# Patient Record
Sex: MALE | NOT HISPANIC OR LATINO | URBAN - METROPOLITAN AREA
[De-identification: names, ages, dates, MRNs, and addresses within clinical notes are randomized per-mention and may not be internally consistent; named-entity substitution may affect disease eponyms.]

---

## 2021-12-14 ENCOUNTER — TELEPHONE ENCOUNTER (OUTPATIENT)
Dept: URBAN - METROPOLITAN AREA CLINIC 92 | Facility: CLINIC | Age: 66
End: 2021-12-14

## 2021-12-14 RX ORDER — FAMOTIDINE 40 MG/1
1 TABLET AT BEDTIME TABLET, FILM COATED ORAL ONCE A DAY
Qty: 90 TABLET | Refills: 1 | OUTPATIENT
Start: 2021-12-15

## 2021-12-20 ENCOUNTER — LAB OUTSIDE AN ENCOUNTER (OUTPATIENT)
Dept: URBAN - METROPOLITAN AREA CLINIC 82 | Facility: CLINIC | Age: 66
End: 2021-12-20

## 2021-12-20 ENCOUNTER — OFFICE VISIT (OUTPATIENT)
Dept: URBAN - METROPOLITAN AREA CLINIC 82 | Facility: CLINIC | Age: 66
End: 2021-12-20
Payer: MEDICARE

## 2021-12-20 VITALS
WEIGHT: 226 LBS | BODY MASS INDEX: 30.61 KG/M2 | TEMPERATURE: 97.2 F | HEART RATE: 106 BPM | HEIGHT: 72 IN | DIASTOLIC BLOOD PRESSURE: 86 MMHG | SYSTOLIC BLOOD PRESSURE: 136 MMHG

## 2021-12-20 DIAGNOSIS — R10.12 LUQ ABDOMINAL PAIN: ICD-10-CM

## 2021-12-20 DIAGNOSIS — K21.9 GASTROESOPHAGEAL REFLUX DISEASE, UNSPECIFIED WHETHER ESOPHAGITIS PRESENT: ICD-10-CM

## 2021-12-20 DIAGNOSIS — Z12.11 COLON CANCER SCREENING: ICD-10-CM

## 2021-12-20 PROBLEM — 235595009: Status: ACTIVE | Noted: 2021-12-20

## 2021-12-20 PROCEDURE — 99204 OFFICE O/P NEW MOD 45 MIN: CPT | Performed by: INTERNAL MEDICINE

## 2021-12-20 RX ORDER — GLIMEPIRIDE 2 MG/1
1 TABLET WITH BREAKFAST OR THE FIRST MAIN MEAL OF THE DAY TABLET ORAL ONCE A DAY
Status: ACTIVE | COMMUNITY

## 2021-12-20 RX ORDER — POLYETHYLENE GLYCOL 3350 17 G/17G
AS DIRECTED PRIOR TO COLONOSCOPY POWDER, FOR SOLUTION ORAL ONCE
Qty: 238 GRAM | Refills: 0 | OUTPATIENT
Start: 2021-12-21 | End: 2021-12-22

## 2021-12-20 RX ORDER — SUCRALFATE 1 G
1 TABLET ON AN EMPTY STOMACH TABLET ORAL TWICE A DAY
Qty: 60 TABLET | Refills: 0 | OUTPATIENT
Start: 2021-12-21 | End: 2022-01-20

## 2021-12-20 RX ORDER — LISINOPRIL 40 MG/1
1 TABLET TABLET ORAL ONCE A DAY
Status: ACTIVE | COMMUNITY

## 2021-12-20 RX ORDER — AMLODIPINE BESYLATE 10 MG/1
1 TABLET TABLET ORAL ONCE A DAY
Status: ACTIVE | COMMUNITY

## 2021-12-20 RX ORDER — OMEPRAZOLE 40 MG/1
1 CAPSULE 30 MINUTES BEFORE MORNING MEAL CAPSULE, DELAYED RELEASE ORAL ONCE A DAY
Qty: 30 CAPSULE | Refills: 1 | OUTPATIENT
Start: 2021-12-20

## 2021-12-20 RX ORDER — ALLOPURINOL 300 MG/1
1 TABLET TABLET ORAL ONCE A DAY
Status: ACTIVE | COMMUNITY

## 2021-12-20 RX ORDER — LORAZEPAM 0.5 MG/1
1 TABLET AT BEDTIME AS NEEDED TABLET ORAL ONCE A DAY
Status: ACTIVE | COMMUNITY

## 2021-12-20 RX ORDER — HYDROCHLOROTHIAZIDE 12.5 MG/1
1 CAPSULE IN THE MORNING CAPSULE ORAL ONCE A DAY
Status: ACTIVE | COMMUNITY

## 2021-12-20 RX ORDER — FAMOTIDINE 40 MG/1
1 TABLET AT BEDTIME TABLET, FILM COATED ORAL ONCE A DAY
Qty: 90 TABLET | Refills: 1 | Status: ACTIVE | COMMUNITY
Start: 2021-12-15

## 2021-12-20 RX ORDER — ATORVASTATIN CALCIUM 20 MG/1
1 TABLET TABLET, FILM COATED ORAL ONCE A DAY
Status: ACTIVE | COMMUNITY

## 2021-12-20 NOTE — HPI-TODAY'S VISIT:
Earle Dalal is a 66-year-old male came in for screening colonoscopy evaluation and also complains of having left upper quadrant abdominal pain as well as discomfort.  He reports having the symptoms ongoing off and on for the past month symptoms getting worse epigastric abdominal pain after eating food.  Denies any surgical history or family history.  Patient denies any unintentional weight loss.  He has been taking PPI for 2 weeks with some relief.  He also complains of having blood in his stools mostly when he wipes.  Denies any blood mixed with the stool or her melena.  Admits to using NSAIDs occasionally.  His last colonoscopy was about 10 years ago which was unremarkable.

## 2021-12-20 NOTE — PHYSICAL EXAM GASTROINTESTINAL
Abdomen , soft, nontender, nondistended , no guarding or rigidity , no masses palpable , normal bowel sounds , Liver and Spleen , no hepatomegaly present , no hepatosplenomegaly , liver nontender , spleen not palpable ventral hernia

## 2021-12-21 ENCOUNTER — OFFICE VISIT (OUTPATIENT)
Dept: URBAN - METROPOLITAN AREA SURGERY CENTER 13 | Facility: SURGERY CENTER | Age: 66
End: 2021-12-21
Payer: MEDICARE

## 2021-12-21 ENCOUNTER — CLAIMS CREATED FROM THE CLAIM WINDOW (OUTPATIENT)
Dept: URBAN - METROPOLITAN AREA CLINIC 4 | Facility: CLINIC | Age: 66
End: 2021-12-21
Payer: MEDICARE

## 2021-12-21 DIAGNOSIS — K31.89 ACQUIRED DEFORMITY OF DUODENUM: ICD-10-CM

## 2021-12-21 DIAGNOSIS — K22.2 ACQUIRED ESOPHAGEAL RING: ICD-10-CM

## 2021-12-21 DIAGNOSIS — R10.13 ABDOMINAL DISCOMFORT, EPIGASTRIC: ICD-10-CM

## 2021-12-21 DIAGNOSIS — K29.70 GASTRITIS, UNSPECIFIED, WITHOUT BLEEDING: ICD-10-CM

## 2021-12-21 DIAGNOSIS — K22.89 ESOPHAGEAL BLEEDING: ICD-10-CM

## 2021-12-21 PROCEDURE — 88312 SPECIAL STAINS GROUP 1: CPT | Performed by: PATHOLOGY

## 2021-12-21 PROCEDURE — G8907 PT DOC NO EVENTS ON DISCHARG: HCPCS | Performed by: INTERNAL MEDICINE

## 2021-12-21 PROCEDURE — 88305 TISSUE EXAM BY PATHOLOGIST: CPT | Performed by: PATHOLOGY

## 2021-12-21 PROCEDURE — 43239 EGD BIOPSY SINGLE/MULTIPLE: CPT | Performed by: INTERNAL MEDICINE

## 2021-12-21 RX ORDER — ATORVASTATIN CALCIUM 20 MG/1
1 TABLET TABLET, FILM COATED ORAL ONCE A DAY
Status: ACTIVE | COMMUNITY

## 2021-12-21 RX ORDER — LORAZEPAM 0.5 MG/1
1 TABLET AT BEDTIME AS NEEDED TABLET ORAL ONCE A DAY
Status: ACTIVE | COMMUNITY

## 2021-12-21 RX ORDER — LISINOPRIL 40 MG/1
1 TABLET TABLET ORAL ONCE A DAY
Status: ACTIVE | COMMUNITY

## 2021-12-21 RX ORDER — ALLOPURINOL 300 MG/1
1 TABLET TABLET ORAL ONCE A DAY
Status: ACTIVE | COMMUNITY

## 2021-12-21 RX ORDER — GLIMEPIRIDE 2 MG/1
1 TABLET WITH BREAKFAST OR THE FIRST MAIN MEAL OF THE DAY TABLET ORAL ONCE A DAY
Status: ACTIVE | COMMUNITY

## 2021-12-21 RX ORDER — HYDROCHLOROTHIAZIDE 12.5 MG/1
1 CAPSULE IN THE MORNING CAPSULE ORAL ONCE A DAY
Status: ACTIVE | COMMUNITY

## 2021-12-21 RX ORDER — AMLODIPINE BESYLATE 10 MG/1
1 TABLET TABLET ORAL ONCE A DAY
Status: ACTIVE | COMMUNITY

## 2021-12-21 RX ORDER — FAMOTIDINE 40 MG/1
1 TABLET AT BEDTIME TABLET, FILM COATED ORAL ONCE A DAY
Qty: 90 TABLET | Refills: 1 | Status: ACTIVE | COMMUNITY
Start: 2021-12-15

## 2021-12-21 RX ORDER — OMEPRAZOLE 40 MG/1
1 CAPSULE 30 MINUTES BEFORE MORNING MEAL CAPSULE, DELAYED RELEASE ORAL ONCE A DAY
Qty: 30 CAPSULE | Refills: 1 | Status: ACTIVE | COMMUNITY
Start: 2021-12-20

## 2021-12-23 ENCOUNTER — OFFICE VISIT (OUTPATIENT)
Dept: URBAN - METROPOLITAN AREA TELEHEALTH 2 | Facility: TELEHEALTH | Age: 66
End: 2021-12-23

## 2021-12-28 ENCOUNTER — TELEPHONE ENCOUNTER (OUTPATIENT)
Dept: URBAN - METROPOLITAN AREA CLINIC 92 | Facility: CLINIC | Age: 66
End: 2021-12-28

## 2022-01-11 ENCOUNTER — ERX REFILL RESPONSE (OUTPATIENT)
Dept: URBAN - METROPOLITAN AREA CLINIC 82 | Facility: CLINIC | Age: 67
End: 2022-01-11

## 2022-01-11 RX ORDER — SUCRALFATE 1 G/1
TAKE 1 TABLET BY MOUTH TWICE A DAY ON EMPTY STOMACH FOR 30 DAYS TABLET ORAL
Qty: 60 TABLET | Refills: 1 | OUTPATIENT

## 2022-01-11 RX ORDER — SUCRALFATE 1 G
1 TABLET ON AN EMPTY STOMACH TABLET ORAL TWICE A DAY
Qty: 60 TABLET | Refills: 0 | OUTPATIENT

## 2022-01-12 ENCOUNTER — ERX REFILL RESPONSE (OUTPATIENT)
Dept: URBAN - METROPOLITAN AREA CLINIC 82 | Facility: CLINIC | Age: 67
End: 2022-01-12

## 2022-01-12 RX ORDER — OMEPRAZOLE 40 MG/1
TAKE 1 CAPSULE BY MOUTH EVERY DAY 30 MINUTES BEFORE MORNING MEAL FOR 30 DAYS CAPSULE, DELAYED RELEASE ORAL
Qty: 30 CAPSULE | Refills: 2 | OUTPATIENT

## 2022-01-12 RX ORDER — OMEPRAZOLE 40 MG/1
1 CAPSULE 30 MINUTES BEFORE MORNING MEAL CAPSULE, DELAYED RELEASE ORAL ONCE A DAY
Qty: 30 CAPSULE | Refills: 1 | OUTPATIENT

## 2022-02-04 ENCOUNTER — WEB ENCOUNTER (OUTPATIENT)
Dept: URBAN - METROPOLITAN AREA CLINIC 82 | Facility: CLINIC | Age: 67
End: 2022-02-04

## 2022-02-07 ENCOUNTER — ERX REFILL RESPONSE (OUTPATIENT)
Dept: URBAN - METROPOLITAN AREA CLINIC 82 | Facility: CLINIC | Age: 67
End: 2022-02-07

## 2022-02-07 RX ORDER — OMEPRAZOLE 40 MG/1
TAKE 1 CAPSULE BY MOUTH EVERY DAY 30 MINUTES BEFORE MORNING MEAL FOR 30 DAYS CAPSULE, DELAYED RELEASE ORAL
Qty: 30 CAPSULE | Refills: 2 | OUTPATIENT

## 2022-03-06 ENCOUNTER — ERX REFILL RESPONSE (OUTPATIENT)
Dept: URBAN - METROPOLITAN AREA CLINIC 82 | Facility: CLINIC | Age: 67
End: 2022-03-06

## 2022-03-06 RX ORDER — OMEPRAZOLE 40 MG/1
TAKE 1 CAPSULE BY MOUTH EVERY DAY 30 MINUTES BEFORE MORNING MEAL FOR 30 DAYS CAPSULE, DELAYED RELEASE ORAL
Qty: 30 CAPSULE | Refills: 2 | OUTPATIENT

## 2022-03-31 ENCOUNTER — ERX REFILL RESPONSE (OUTPATIENT)
Dept: URBAN - METROPOLITAN AREA CLINIC 82 | Facility: CLINIC | Age: 67
End: 2022-03-31

## 2022-03-31 RX ORDER — OMEPRAZOLE 40 MG/1
TAKE 1 CAPSULE BY MOUTH EVERY DAY 30 MINUTES BEFORE MORNING MEAL FOR 30 DAYS CAPSULE, DELAYED RELEASE ORAL
Qty: 30 CAPSULE | Refills: 2 | OUTPATIENT

## 2022-05-02 ENCOUNTER — ERX REFILL RESPONSE (OUTPATIENT)
Dept: URBAN - METROPOLITAN AREA CLINIC 82 | Facility: CLINIC | Age: 67
End: 2022-05-02

## 2022-05-02 RX ORDER — OMEPRAZOLE 40 MG/1
TAKE 1 CAPSULE BY MOUTH EVERY DAY 30 MINUTES BEFORE MORNING MEAL FOR 30 DAYS CAPSULE, DELAYED RELEASE ORAL
Qty: 30 CAPSULE | Refills: 2 | OUTPATIENT

## 2022-05-06 ENCOUNTER — TELEPHONE ENCOUNTER (OUTPATIENT)
Dept: URBAN - METROPOLITAN AREA CLINIC 115 | Facility: CLINIC | Age: 67
End: 2022-05-06

## 2022-05-06 RX ORDER — OMEPRAZOLE 40 MG/1
TAKE 1 CAPSULE BY MOUTH EVERY DAY 30 MINUTES BEFORE MORNING MEAL FOR 30 DAYS CAPSULE, DELAYED RELEASE ORAL
Qty: 30 CAPSULE | Refills: 2

## 2022-06-02 ENCOUNTER — ERX REFILL RESPONSE (OUTPATIENT)
Dept: URBAN - METROPOLITAN AREA CLINIC 82 | Facility: CLINIC | Age: 67
End: 2022-06-02

## 2022-06-02 RX ORDER — OMEPRAZOLE 40 MG/1
TAKE 1 CAPSULE BY MOUTH EVERY DAY 30 MINUTES BEFORE MORNING MEAL FOR 30 DAYS CAPSULE, DELAYED RELEASE ORAL
Qty: 30 CAPSULE | Refills: 2 | OUTPATIENT

## 2022-08-16 ENCOUNTER — ERX REFILL RESPONSE (OUTPATIENT)
Dept: URBAN - METROPOLITAN AREA CLINIC 82 | Facility: CLINIC | Age: 67
End: 2022-08-16

## 2022-08-16 RX ORDER — OMEPRAZOLE 40 MG/1
TAKE 1 CAPSULE BY MOUTH EVERY DAY 30 MINUTES BEFORE MORNING MEAL FOR 30 DAYS CAPSULE, DELAYED RELEASE ORAL
Qty: 30 CAPSULE | Refills: 1 | OUTPATIENT

## 2022-08-16 RX ORDER — OMEPRAZOLE 40 MG/1
TAKE 1 CAPSULE BY MOUTH EVERY DAY 30 MINUTES BEFORE MORNING MEAL FOR 30 DAYS CAPSULE, DELAYED RELEASE ORAL
Qty: 30 CAPSULE | Refills: 2 | OUTPATIENT

## 2022-09-20 ENCOUNTER — ERX REFILL RESPONSE (OUTPATIENT)
Dept: URBAN - METROPOLITAN AREA CLINIC 82 | Facility: CLINIC | Age: 67
End: 2022-09-20

## 2022-09-20 RX ORDER — OMEPRAZOLE 40 MG/1
TAKE 1 CAPSULE BY MOUTH EVERY DAY 30 MINUTES BEFORE MORNING MEAL FOR 30 DAYS CAPSULE, DELAYED RELEASE ORAL
Qty: 30 CAPSULE | Refills: 1 | OUTPATIENT

## 2022-09-26 ENCOUNTER — ERX REFILL RESPONSE (OUTPATIENT)
Dept: URBAN - METROPOLITAN AREA CLINIC 82 | Facility: CLINIC | Age: 67
End: 2022-09-26

## 2022-09-26 RX ORDER — OMEPRAZOLE 40 MG/1
TAKE 1 CAPSULE BY MOUTH EVERY DAY 30 MINUTES BEFORE MORNING MEAL CAPSULE, DELAYED RELEASE ORAL
Qty: 90 CAPSULE | Refills: 0 | OUTPATIENT

## 2022-09-27 PROBLEM — 305058001: Status: ACTIVE | Noted: 2021-12-20

## 2022-10-07 ENCOUNTER — OFFICE VISIT (OUTPATIENT)
Dept: URBAN - METROPOLITAN AREA SURGERY CENTER 13 | Facility: SURGERY CENTER | Age: 67
End: 2022-10-07
Payer: MEDICARE

## 2022-10-07 ENCOUNTER — TELEPHONE ENCOUNTER (OUTPATIENT)
Dept: URBAN - METROPOLITAN AREA CLINIC 92 | Facility: CLINIC | Age: 67
End: 2022-10-07

## 2022-10-07 DIAGNOSIS — Z12.11 COLON CANCER SCREENING: ICD-10-CM

## 2022-10-07 PROCEDURE — G0121 COLON CA SCRN NOT HI RSK IND: HCPCS | Performed by: INTERNAL MEDICINE

## 2022-10-07 PROCEDURE — G8907 PT DOC NO EVENTS ON DISCHARG: HCPCS | Performed by: INTERNAL MEDICINE

## 2022-10-07 RX ORDER — FAMOTIDINE 40 MG/1
1 TABLET AT BEDTIME TABLET, FILM COATED ORAL ONCE A DAY
Qty: 90 TABLET | Refills: 1 | Status: ACTIVE | COMMUNITY
Start: 2021-12-15

## 2022-10-07 RX ORDER — SUCRALFATE 1 G/1
TAKE 1 TABLET BY MOUTH TWICE A DAY ON EMPTY STOMACH FOR 30 DAYS TABLET ORAL
Qty: 60 TABLET | Refills: 1

## 2022-10-07 RX ORDER — OMEPRAZOLE 40 MG/1
TAKE 1 CAPSULE BY MOUTH EVERY DAY 30 MINUTES BEFORE MORNING MEAL CAPSULE, DELAYED RELEASE ORAL
Qty: 90 CAPSULE | Refills: 0 | Status: ACTIVE | COMMUNITY

## 2022-10-07 RX ORDER — AMLODIPINE BESYLATE 10 MG/1
1 TABLET TABLET ORAL ONCE A DAY
Status: ACTIVE | COMMUNITY

## 2022-10-07 RX ORDER — LORAZEPAM 0.5 MG/1
1 TABLET AT BEDTIME AS NEEDED TABLET ORAL ONCE A DAY
Status: ACTIVE | COMMUNITY

## 2022-10-07 RX ORDER — SUCRALFATE 1 G/1
TAKE 1 TABLET BY MOUTH TWICE A DAY ON EMPTY STOMACH FOR 30 DAYS TABLET ORAL
Qty: 60 TABLET | Refills: 1 | Status: ACTIVE | COMMUNITY

## 2022-10-07 RX ORDER — LISINOPRIL 40 MG/1
1 TABLET TABLET ORAL ONCE A DAY
Status: ACTIVE | COMMUNITY

## 2022-10-07 RX ORDER — FAMOTIDINE 40 MG/1
1 TABLET AT BEDTIME TABLET, FILM COATED ORAL ONCE A DAY
Qty: 90 TABLET | Refills: 1
Start: 2021-12-15

## 2022-10-07 RX ORDER — ATORVASTATIN CALCIUM 20 MG/1
1 TABLET TABLET, FILM COATED ORAL ONCE A DAY
Status: ACTIVE | COMMUNITY

## 2022-10-07 RX ORDER — GLIMEPIRIDE 2 MG/1
1 TABLET WITH BREAKFAST OR THE FIRST MAIN MEAL OF THE DAY TABLET ORAL ONCE A DAY
Status: ACTIVE | COMMUNITY

## 2022-10-07 RX ORDER — ALLOPURINOL 300 MG/1
1 TABLET TABLET ORAL ONCE A DAY
Status: ACTIVE | COMMUNITY

## 2022-10-07 RX ORDER — HYDROCHLOROTHIAZIDE 12.5 MG/1
1 CAPSULE IN THE MORNING CAPSULE ORAL ONCE A DAY
Status: ACTIVE | COMMUNITY

## 2022-10-20 ENCOUNTER — ERX REFILL RESPONSE (OUTPATIENT)
Dept: URBAN - METROPOLITAN AREA CLINIC 82 | Facility: CLINIC | Age: 67
End: 2022-10-20

## 2022-10-20 RX ORDER — OMEPRAZOLE 40 MG/1
TAKE 1 CAPSULE BY MOUTH EVERY DAY 30 MINUTES BEFORE MORNING MEAL FOR 30 DAYS CAPSULE, DELAYED RELEASE ORAL
Qty: 30 CAPSULE | Refills: 1 | OUTPATIENT

## 2022-10-20 RX ORDER — OMEPRAZOLE 40 MG/1
TAKE 1 CAPSULE BY MOUTH EVERY DAY 30 MINUTES BEFORE MORNING MEAL FOR 30 DAYS CAPSULE, DELAYED RELEASE ORAL
Qty: 30 CAPSULE | Refills: 2 | OUTPATIENT

## 2022-10-31 ENCOUNTER — ERX REFILL RESPONSE (OUTPATIENT)
Dept: URBAN - METROPOLITAN AREA CLINIC 82 | Facility: CLINIC | Age: 67
End: 2022-10-31

## 2022-10-31 RX ORDER — SUCRALFATE 1 G/1
TAKE 1 TABLET BY MOUTH TWICE A DAY ON EMPTY STOMACH FOR 30 DAYS 30 DAYS TABLET ORAL
Qty: 60 TABLET | Refills: 1 | OUTPATIENT

## 2022-10-31 RX ORDER — SUCRALFATE 1 G/1
TAKE 1 TABLET BY MOUTH TWICE A DAY ON EMPTY STOMACH FOR 30 DAYS TABLET ORAL
Qty: 60 TABLET | Refills: 1 | OUTPATIENT

## 2022-11-22 ENCOUNTER — ERX REFILL RESPONSE (OUTPATIENT)
Dept: URBAN - METROPOLITAN AREA CLINIC 82 | Facility: CLINIC | Age: 67
End: 2022-11-22

## 2022-11-22 RX ORDER — SUCRALFATE 1 G/1
TAKE 1 TABLET BY MOUTH TWICE A DAY ON EMPTY STOMACH FOR 30 DAYS 30 DAYS TABLET ORAL
Qty: 60 TABLET | Refills: 1 | OUTPATIENT

## 2022-12-18 ENCOUNTER — ERX REFILL RESPONSE (OUTPATIENT)
Dept: URBAN - METROPOLITAN AREA CLINIC 115 | Facility: CLINIC | Age: 67
End: 2022-12-18

## 2022-12-18 RX ORDER — OMEPRAZOLE 40 MG/1
TAKE 1 CAPSULE BY MOUTH EVERY DAY 30 MINUTES BEFORE MORNING MEAL FOR 30 DAYS CAPSULE, DELAYED RELEASE ORAL
Qty: 30 CAPSULE | Refills: 1 | OUTPATIENT

## 2022-12-18 RX ORDER — OMEPRAZOLE 40 MG/1
TAKE 1 CAPSULE BY MOUTH EVERY DAY 30 MINUTES BEFORE MORNING MEAL FOR 30 DAYS CAPSULE, DELAYED RELEASE ORAL
Qty: 90 CAPSULE | Refills: 1 | OUTPATIENT

## 2023-02-19 ENCOUNTER — TELEPHONE ENCOUNTER (OUTPATIENT)
Dept: URBAN - METROPOLITAN AREA CLINIC 82 | Facility: CLINIC | Age: 68
End: 2023-02-19

## 2023-02-19 RX ORDER — FAMOTIDINE 40 MG/1
1 TABLET AT BEDTIME TABLET, FILM COATED ORAL ONCE A DAY
Qty: 90 | Refills: 0
Start: 2021-12-15

## 2023-04-12 ENCOUNTER — WEB ENCOUNTER (OUTPATIENT)
Dept: URBAN - METROPOLITAN AREA CLINIC 115 | Facility: CLINIC | Age: 68
End: 2023-04-12

## 2023-04-12 ENCOUNTER — OFFICE VISIT (OUTPATIENT)
Dept: URBAN - METROPOLITAN AREA CLINIC 115 | Facility: CLINIC | Age: 68
End: 2023-04-12
Payer: MEDICARE

## 2023-04-12 VITALS
TEMPERATURE: 98.1 F | HEIGHT: 72 IN | DIASTOLIC BLOOD PRESSURE: 84 MMHG | BODY MASS INDEX: 30.34 KG/M2 | HEART RATE: 96 BPM | SYSTOLIC BLOOD PRESSURE: 147 MMHG | WEIGHT: 224 LBS

## 2023-04-12 DIAGNOSIS — K21.9 GASTROESOPHAGEAL REFLUX DISEASE, UNSPECIFIED WHETHER ESOPHAGITIS PRESENT: ICD-10-CM

## 2023-04-12 DIAGNOSIS — K29.50 CHRONIC GASTRITIS WITHOUT BLEEDING, UNSPECIFIED GASTRITIS TYPE: ICD-10-CM

## 2023-04-12 PROBLEM — 8493009: Status: ACTIVE | Noted: 2023-04-12

## 2023-04-12 PROCEDURE — 99214 OFFICE O/P EST MOD 30 MIN: CPT | Performed by: INTERNAL MEDICINE

## 2023-04-12 RX ORDER — FAMOTIDINE 40 MG/1
1 TABLET AT BEDTIME TABLET, FILM COATED ORAL ONCE A DAY
Qty: 90 | Refills: 0 | Status: ACTIVE | COMMUNITY
Start: 2021-12-15

## 2023-04-12 RX ORDER — LISINOPRIL 40 MG/1
1 TABLET TABLET ORAL ONCE A DAY
Status: ACTIVE | COMMUNITY

## 2023-04-12 RX ORDER — LORAZEPAM 0.5 MG/1
1 TABLET AT BEDTIME AS NEEDED TABLET ORAL ONCE A DAY
Status: ACTIVE | COMMUNITY

## 2023-04-12 RX ORDER — AMLODIPINE BESYLATE 10 MG/1
1 TABLET TABLET ORAL ONCE A DAY
Status: ACTIVE | COMMUNITY

## 2023-04-12 RX ORDER — SUCRALFATE 1 G/1
TAKE 1 TABLET BY MOUTH TWICE A DAY ON EMPTY STOMACH FOR 30 DAYS 30 DAYS TABLET ORAL
Qty: 60 TABLET | Refills: 1 | Status: ACTIVE | COMMUNITY

## 2023-04-12 RX ORDER — GLIMEPIRIDE 2 MG/1
1 TABLET WITH BREAKFAST OR THE FIRST MAIN MEAL OF THE DAY TABLET ORAL ONCE A DAY
Status: ACTIVE | COMMUNITY

## 2023-04-12 RX ORDER — OMEPRAZOLE 40 MG/1
TAKE 1 CAPSULE BY MOUTH EVERY DAY 30 MINUTES BEFORE MORNING MEAL FOR 30 DAYS CAPSULE, DELAYED RELEASE ORAL
Qty: 90 CAPSULE | Refills: 1 | Status: ACTIVE | COMMUNITY

## 2023-04-12 RX ORDER — ATORVASTATIN CALCIUM 20 MG/1
1 TABLET TABLET, FILM COATED ORAL ONCE A DAY
Status: ACTIVE | COMMUNITY

## 2023-04-12 RX ORDER — ALLOPURINOL 300 MG/1
1 TABLET TABLET ORAL ONCE A DAY
Status: ACTIVE | COMMUNITY

## 2023-04-12 RX ORDER — HYDROCHLOROTHIAZIDE 12.5 MG/1
1 CAPSULE IN THE MORNING CAPSULE ORAL ONCE A DAY
Status: ACTIVE | COMMUNITY

## 2023-04-12 RX ORDER — ESOMEPRAZOLE MAGNESIUM 20 MG/1
1 CAPSULE CAPSULE, DELAYED RELEASE ORAL ONCE A DAY
Qty: 90 CAPSULE | Refills: 5 | OUTPATIENT
Start: 2023-04-12

## 2023-04-12 NOTE — HPI-TODAY'S VISIT:
Patient was seen today for the follow-up denies any abdominal pain nausea vomiting upper GI symptoms has been well controlled.  Patient stated Nexium over-the-counter helps him his reflux symptoms are better than Prilosec.  Patient denies any dysphagia prior upper endoscopy and colonoscopy findings were all reviewed.  Patient denies any dysphagia.

## 2023-05-12 ENCOUNTER — TELEPHONE ENCOUNTER (OUTPATIENT)
Dept: URBAN - METROPOLITAN AREA CLINIC 92 | Facility: CLINIC | Age: 68
End: 2023-05-12

## 2023-05-12 RX ORDER — SUCRALFATE 1 G/1
TAKE 1 TABLET BY MOUTH TWICE A DAY ON EMPTY STOMACH FOR 30 DAYS 30 DAYS TABLET ORAL
Qty: 60 TABLET | Refills: 1

## 2023-06-22 ENCOUNTER — ERX REFILL RESPONSE (OUTPATIENT)
Dept: URBAN - METROPOLITAN AREA CLINIC 82 | Facility: CLINIC | Age: 68
End: 2023-06-22

## 2023-06-22 RX ORDER — FAMOTIDINE 40 MG/1
TAKE 1 TABLET BY MOUTH EVERY DAY AT BEDTIME FOR 90 DAYS TABLET, FILM COATED ORAL
Qty: 90 TABLET | Refills: 0 | OUTPATIENT

## 2023-06-22 RX ORDER — FAMOTIDINE 40 MG/1
1 TABLET AT BEDTIME TABLET, FILM COATED ORAL ONCE A DAY
Qty: 90 | Refills: 0 | OUTPATIENT

## 2023-09-24 ENCOUNTER — ERX REFILL RESPONSE (OUTPATIENT)
Dept: URBAN - METROPOLITAN AREA CLINIC 82 | Facility: CLINIC | Age: 68
End: 2023-09-24

## 2023-09-24 RX ORDER — FAMOTIDINE 40 MG/1
TAKE 1 TABLET BY MOUTH EVERY DAY AT BEDTIME TABLET, FILM COATED ORAL
Qty: 90 TABLET | Refills: 0 | OUTPATIENT

## 2023-11-08 ENCOUNTER — TELEPHONE ENCOUNTER (OUTPATIENT)
Dept: URBAN - METROPOLITAN AREA CLINIC 115 | Facility: CLINIC | Age: 68
End: 2023-11-08

## 2023-11-08 RX ORDER — SUCRALFATE 1 G/1
TAKE 1 TABLET BY MOUTH TWICE A DAY ON EMPTY STOMACH FOR 30 DAYS 30 DAYS TABLET ORAL
Qty: 60 TABLET | Refills: 1

## 2023-12-17 ENCOUNTER — ERX REFILL RESPONSE (OUTPATIENT)
Dept: URBAN - METROPOLITAN AREA CLINIC 82 | Facility: CLINIC | Age: 68
End: 2023-12-17

## 2023-12-17 RX ORDER — FAMOTIDINE 40 MG/1
TAKE 1 TABLET BY MOUTH EVERY DAY AT BEDTIME TABLET, FILM COATED ORAL
Qty: 90 TABLET | Refills: 0 | OUTPATIENT

## 2023-12-29 ENCOUNTER — ERX REFILL RESPONSE (OUTPATIENT)
Dept: URBAN - METROPOLITAN AREA CLINIC 82 | Facility: CLINIC | Age: 68
End: 2023-12-29

## 2023-12-29 RX ORDER — SUCRALFATE 1 G/1
TAKE 1 TABLET BY MOUTH TWICE A DAY ON EMPTY STOMACH FOR 30 DAYS 30 DAYS TABLET ORAL
Qty: 60 TABLET | Refills: 1 | OUTPATIENT

## 2023-12-29 RX ORDER — SUCRALFATE 1 G/1
TAKE 1 TABLET BY MOUTH TWICE A DAY ON EMPTY STOMACH FOR 30 DAYS 30 DAYS TABLET ORAL
Qty: 180 TABLET | Refills: 1 | OUTPATIENT

## 2024-01-24 ENCOUNTER — LAB OUTSIDE AN ENCOUNTER (OUTPATIENT)
Dept: URBAN - METROPOLITAN AREA CLINIC 115 | Facility: CLINIC | Age: 69
End: 2024-01-24

## 2024-01-24 ENCOUNTER — OFFICE VISIT (OUTPATIENT)
Dept: URBAN - METROPOLITAN AREA CLINIC 115 | Facility: CLINIC | Age: 69
End: 2024-01-24
Payer: MEDICARE

## 2024-01-24 ENCOUNTER — TELEPHONE ENCOUNTER (OUTPATIENT)
Dept: URBAN - METROPOLITAN AREA CLINIC 115 | Facility: CLINIC | Age: 69
End: 2024-01-24

## 2024-01-24 ENCOUNTER — DASHBOARD ENCOUNTERS (OUTPATIENT)
Age: 69
End: 2024-01-24

## 2024-01-24 VITALS
DIASTOLIC BLOOD PRESSURE: 97 MMHG | HEIGHT: 72 IN | BODY MASS INDEX: 29.12 KG/M2 | TEMPERATURE: 98.3 F | HEART RATE: 93 BPM | SYSTOLIC BLOOD PRESSURE: 155 MMHG | WEIGHT: 215 LBS

## 2024-01-24 DIAGNOSIS — K21.9 GASTROESOPHAGEAL REFLUX DISEASE, UNSPECIFIED WHETHER ESOPHAGITIS PRESENT: ICD-10-CM

## 2024-01-24 DIAGNOSIS — K29.50 CHRONIC GASTRITIS WITHOUT BLEEDING, UNSPECIFIED GASTRITIS TYPE: ICD-10-CM

## 2024-01-24 DIAGNOSIS — R10.13 EPIGASTRIC PAIN: ICD-10-CM

## 2024-01-24 PROCEDURE — 99214 OFFICE O/P EST MOD 30 MIN: CPT | Performed by: INTERNAL MEDICINE

## 2024-01-24 RX ORDER — LORAZEPAM 0.5 MG/1
1 TABLET AT BEDTIME AS NEEDED TABLET ORAL ONCE A DAY
Status: ACTIVE | COMMUNITY

## 2024-01-24 RX ORDER — HYDROCHLOROTHIAZIDE 12.5 MG/1
1 CAPSULE IN THE MORNING CAPSULE ORAL ONCE A DAY
Status: ACTIVE | COMMUNITY

## 2024-01-24 RX ORDER — FAMOTIDINE 40 MG/1
TAKE 1 TABLET BY MOUTH EVERY DAY AT BEDTIME TABLET, FILM COATED ORAL
Qty: 90 TABLET | Refills: 0 | Status: ON HOLD | COMMUNITY

## 2024-01-24 RX ORDER — AMLODIPINE BESYLATE 10 MG/1
1 TABLET TABLET ORAL ONCE A DAY
Status: ACTIVE | COMMUNITY

## 2024-01-24 RX ORDER — SUCRALFATE 1 G/1
TAKE 1 TABLET BY MOUTH TWICE A DAY ON EMPTY STOMACH FOR 30 DAYS 30 DAYS TABLET ORAL
Qty: 180 TABLET | Refills: 1 | Status: ON HOLD | COMMUNITY

## 2024-01-24 RX ORDER — LISINOPRIL 40 MG/1
1 TABLET TABLET ORAL ONCE A DAY
Status: ACTIVE | COMMUNITY

## 2024-01-24 RX ORDER — ALLOPURINOL 300 MG/1
1 TABLET TABLET ORAL ONCE A DAY
Status: ACTIVE | COMMUNITY

## 2024-01-24 RX ORDER — ATORVASTATIN CALCIUM 20 MG/1
1 TABLET TABLET, FILM COATED ORAL ONCE A DAY
Status: ACTIVE | COMMUNITY

## 2024-01-24 RX ORDER — GLIMEPIRIDE 2 MG/1
1 TABLET WITH BREAKFAST OR THE FIRST MAIN MEAL OF THE DAY TABLET ORAL ONCE A DAY
Status: ACTIVE | COMMUNITY

## 2024-01-24 RX ORDER — ESOMEPRAZOLE MAGNESIUM 20 MG/1
1 CAPSULE CAPSULE, DELAYED RELEASE ORAL ONCE A DAY
Qty: 90 CAPSULE | Refills: 5 | Status: ACTIVE | COMMUNITY
Start: 2023-04-12

## 2024-01-24 RX ORDER — ESOMEPRAZOLE MAGNESIUM 40 MG/1
1 CAPSULE CAPSULE, DELAYED RELEASE ORAL ONCE A DAY
Qty: 90 CAPSULE | Refills: 3 | OUTPATIENT
Start: 2024-01-24

## 2024-01-24 RX ORDER — OMEPRAZOLE 40 MG/1
TAKE 1 CAPSULE BY MOUTH EVERY DAY 30 MINUTES BEFORE MORNING MEAL FOR 30 DAYS CAPSULE, DELAYED RELEASE ORAL
Qty: 90 CAPSULE | Refills: 1 | Status: ACTIVE | COMMUNITY

## 2024-01-24 NOTE — HPI-TODAY'S VISIT:
Patient was seen today for complaints of having epigastric abdominal pain as well as a recent episodes of reflux and regurgitation of food.  Patient was concerned about ongoing symptoms and would like to know if he can get a hiatal hernia repair.  Reports having intermittent epigastric discomfort and pain when he eats food.  Denies any dysphagia.  Patient has been on PPI daily despite that he has been having the symptoms.  He admits for having stress and anxiety.  His current medications and supplements were reviewed.  Denies taking any frequent use of NSAIDs.  Denies any unintentional weight loss.  He is not taking any sucralfate.  Prior procedure was also reviewed.

## 2024-02-12 PROBLEM — 63305008: Status: ACTIVE | Noted: 2024-02-12

## 2024-02-15 ENCOUNTER — LAB (OUTPATIENT)
Dept: URBAN - METROPOLITAN AREA CLINIC 82 | Facility: CLINIC | Age: 69
End: 2024-02-15

## 2024-02-26 ENCOUNTER — EGD W/ BRV (OUTPATIENT)
Dept: URBAN - METROPOLITAN AREA SURGERY CENTER 13 | Facility: SURGERY CENTER | Age: 69
End: 2024-02-26

## 2024-02-28 ENCOUNTER — OTHER (OUTPATIENT)
Dept: URBAN - METROPOLITAN AREA CLINIC 82 | Facility: CLINIC | Age: 69
End: 2024-02-28

## 2024-02-28 RX ORDER — ESOMEPRAZOLE MAGNESIUM 20 MG/1
1 CAPSULE CAPSULE, DELAYED RELEASE ORAL ONCE A DAY
Qty: 90 CAPSULE | Refills: 5 | Status: ACTIVE | COMMUNITY
Start: 2023-04-12

## 2024-02-28 RX ORDER — LORAZEPAM 0.5 MG/1
1 TABLET AT BEDTIME AS NEEDED TABLET ORAL ONCE A DAY
Status: ACTIVE | COMMUNITY

## 2024-02-28 RX ORDER — ESOMEPRAZOLE MAGNESIUM 40 MG/1
1 CAPSULE CAPSULE, DELAYED RELEASE ORAL ONCE A DAY
Qty: 90 CAPSULE | Refills: 3 | Status: ACTIVE | COMMUNITY
Start: 2024-01-24

## 2024-02-28 RX ORDER — GLIMEPIRIDE 2 MG/1
1 TABLET WITH BREAKFAST OR THE FIRST MAIN MEAL OF THE DAY TABLET ORAL ONCE A DAY
Status: ACTIVE | COMMUNITY

## 2024-02-28 RX ORDER — ATORVASTATIN CALCIUM 20 MG/1
1 TABLET TABLET, FILM COATED ORAL ONCE A DAY
Status: ACTIVE | COMMUNITY

## 2024-02-28 RX ORDER — OMEPRAZOLE 40 MG/1
TAKE 1 CAPSULE BY MOUTH EVERY DAY 30 MINUTES BEFORE MORNING MEAL FOR 30 DAYS CAPSULE, DELAYED RELEASE ORAL
Qty: 90 CAPSULE | Refills: 1 | Status: ACTIVE | COMMUNITY

## 2024-02-28 RX ORDER — ALLOPURINOL 300 MG/1
1 TABLET TABLET ORAL ONCE A DAY
Status: ACTIVE | COMMUNITY

## 2024-02-28 RX ORDER — LISINOPRIL 40 MG/1
1 TABLET TABLET ORAL ONCE A DAY
Status: ACTIVE | COMMUNITY

## 2024-02-28 RX ORDER — HYDROCHLOROTHIAZIDE 12.5 MG/1
1 CAPSULE IN THE MORNING CAPSULE ORAL ONCE A DAY
Status: ACTIVE | COMMUNITY

## 2024-02-28 RX ORDER — FAMOTIDINE 40 MG/1
TAKE 1 TABLET BY MOUTH EVERY DAY AT BEDTIME TABLET, FILM COATED ORAL
Qty: 90 TABLET | Refills: 0 | Status: ON HOLD | COMMUNITY

## 2024-02-28 RX ORDER — SUCRALFATE 1 G/1
TAKE 1 TABLET BY MOUTH TWICE A DAY ON EMPTY STOMACH FOR 30 DAYS 30 DAYS TABLET ORAL
Qty: 180 TABLET | Refills: 1 | Status: ON HOLD | COMMUNITY

## 2024-02-28 RX ORDER — AMLODIPINE BESYLATE 10 MG/1
1 TABLET TABLET ORAL ONCE A DAY
Status: ACTIVE | COMMUNITY

## 2024-03-28 ENCOUNTER — EGD W/ DIL (OUTPATIENT)
Dept: URBAN - METROPOLITAN AREA SURGERY CENTER 13 | Facility: SURGERY CENTER | Age: 69
End: 2024-03-28

## 2024-10-23 ENCOUNTER — WEB ENCOUNTER (OUTPATIENT)
Dept: URBAN - METROPOLITAN AREA CLINIC 115 | Facility: CLINIC | Age: 69
End: 2024-10-23

## 2024-11-04 ENCOUNTER — OFFICE VISIT (OUTPATIENT)
Dept: URBAN - METROPOLITAN AREA CLINIC 115 | Facility: CLINIC | Age: 69
End: 2024-11-04
Payer: MEDICARE

## 2024-11-04 VITALS
WEIGHT: 217.6 LBS | HEIGHT: 72 IN | HEART RATE: 91 BPM | BODY MASS INDEX: 29.47 KG/M2 | SYSTOLIC BLOOD PRESSURE: 142 MMHG | TEMPERATURE: 98.4 F | DIASTOLIC BLOOD PRESSURE: 92 MMHG

## 2024-11-04 DIAGNOSIS — K29.50 CHRONIC GASTRITIS WITHOUT BLEEDING, UNSPECIFIED GASTRITIS TYPE: ICD-10-CM

## 2024-11-04 PROCEDURE — 99213 OFFICE O/P EST LOW 20 MIN: CPT

## 2024-11-04 RX ORDER — ALLOPURINOL 300 MG/1
1 TABLET TABLET ORAL ONCE A DAY
Status: ACTIVE | COMMUNITY

## 2024-11-04 RX ORDER — FAMOTIDINE 40 MG/1
TAKE 1 TABLET BY MOUTH EVERY DAY AT BEDTIME TABLET, FILM COATED ORAL
Qty: 90 TABLET | Refills: 0 | Status: ACTIVE | COMMUNITY

## 2024-11-04 RX ORDER — ESOMEPRAZOLE MAGNESIUM 40 MG/1
1 CAPSULE CAPSULE, DELAYED RELEASE ORAL ONCE A DAY
Qty: 90 CAPSULE | Refills: 3 | Status: ACTIVE | COMMUNITY
Start: 2024-01-24

## 2024-11-04 RX ORDER — ATORVASTATIN CALCIUM 20 MG/1
1 TABLET TABLET, FILM COATED ORAL ONCE A DAY
Status: ON HOLD | COMMUNITY

## 2024-11-04 RX ORDER — VORTIOXETINE 10 MG/1
1 TABLET TABLET, FILM COATED ORAL ONCE A DAY
Status: ACTIVE | COMMUNITY

## 2024-11-04 RX ORDER — OMEPRAZOLE 40 MG/1
TAKE 1 CAPSULE BY MOUTH EVERY DAY 30 MINUTES BEFORE MORNING MEAL FOR 30 DAYS CAPSULE, DELAYED RELEASE ORAL
Qty: 90 CAPSULE | Refills: 1 | Status: ON HOLD | COMMUNITY

## 2024-11-04 RX ORDER — LISINOPRIL 40 MG/1
1 TABLET TABLET ORAL ONCE A DAY
Status: ACTIVE | COMMUNITY

## 2024-11-04 RX ORDER — AMLODIPINE BESYLATE 10 MG/1
1 TABLET TABLET ORAL ONCE A DAY
Status: ACTIVE | COMMUNITY

## 2024-11-04 RX ORDER — SUCRALFATE 1 G/1
TAKE 1 TABLET BY MOUTH TWICE A DAY ON EMPTY STOMACH FOR 30 DAYS 30 DAYS TABLET ORAL
Qty: 180 TABLET | Refills: 1 | Status: ON HOLD | COMMUNITY

## 2024-11-04 RX ORDER — LORAZEPAM 0.5 MG/1
1 TABLET AT BEDTIME AS NEEDED TABLET ORAL ONCE A DAY
Status: ACTIVE | COMMUNITY

## 2024-11-04 RX ORDER — HYDROCHLOROTHIAZIDE 12.5 MG/1
1 CAPSULE IN THE MORNING CAPSULE ORAL ONCE A DAY
Status: ON HOLD | COMMUNITY

## 2024-11-04 RX ORDER — ESOMEPRAZOLE MAGNESIUM 20 MG/1
1 CAPSULE CAPSULE, DELAYED RELEASE ORAL ONCE A DAY
Qty: 90 | Refills: 3
Start: 2023-04-12

## 2024-11-04 RX ORDER — ESOMEPRAZOLE MAGNESIUM 20 MG/1
1 CAPSULE CAPSULE, DELAYED RELEASE ORAL ONCE A DAY
Qty: 90 CAPSULE | Refills: 5 | Status: ACTIVE | COMMUNITY
Start: 2023-04-12

## 2024-11-04 RX ORDER — GLIMEPIRIDE 2 MG/1
1 TABLET WITH BREAKFAST OR THE FIRST MAIN MEAL OF THE DAY TABLET ORAL ONCE A DAY
Status: ACTIVE | COMMUNITY

## 2024-11-04 RX ORDER — PRAVASTATIN SODIUM 20 MG/1
1 TABLET TABLET ORAL ONCE A DAY
Status: ACTIVE | COMMUNITY

## 2024-11-04 NOTE — HPI-TODAY'S VISIT:
68 y/o M with PMH of HTN, HLD, DM presents with c/o GERD on esomeprazole 40mg. States his GERD tends to flare up intermittently, was getting nausea, epigastric tenderness, prod cough. Did note some increased stress at the last flare. Started esomeprazole 40mg after his flare, and now feeling better except for some epigastric tenderness. Also states he has been taking sucralfate. Taking pepcid qhs too which has helped with acidic taste in mouth at night. Was previously taking 20mg esomeprazole when he had the neg B[facility]. Does not associate his flares to diet but did note eating fried fish prior to the flare. Now eating small frequent meals. Denies vomiting, dysphagia, odynophagia, change in appetite, early satiety. Denies NSAID use. Denies  EtOH/tobacco/marijuana use.  EGD with Dr. Morris on 3/2024 showed mild Schatzki ring s/p dilation, 1cm HH, chronic nonspecific gastritis (neg bacteria or IM); [facility] report 2/2024 showed controlled GERD while on PPI; was advised to continue PPI qd. 2/2024 UGI series showed mild MONE without aspiration; transient delay of 12.5mm barium tablet in distal esophagus. 2/2024 GES was normal. Nexium works better than prilosec. 2022 COL showed sigmoid/descending diverticula, ext/int hemorrhoids; repeat in 10Y.